# Patient Record
Sex: FEMALE | Race: BLACK OR AFRICAN AMERICAN | Employment: UNEMPLOYED | URBAN - METROPOLITAN AREA
[De-identification: names, ages, dates, MRNs, and addresses within clinical notes are randomized per-mention and may not be internally consistent; named-entity substitution may affect disease eponyms.]

---

## 2020-08-20 ENCOUNTER — HOSPITAL ENCOUNTER (EMERGENCY)
Age: 70
Discharge: ANOTHER HOSPITAL | End: 2020-08-21
Attending: EMERGENCY MEDICINE
Payer: MEDICARE

## 2020-08-20 ENCOUNTER — APPOINTMENT (OUTPATIENT)
Dept: CT IMAGING | Age: 70
End: 2020-08-20
Payer: MEDICARE

## 2020-08-20 VITALS
OXYGEN SATURATION: 98 % | SYSTOLIC BLOOD PRESSURE: 177 MMHG | HEART RATE: 72 BPM | DIASTOLIC BLOOD PRESSURE: 100 MMHG | HEIGHT: 59 IN | TEMPERATURE: 98.6 F | WEIGHT: 85 LBS | RESPIRATION RATE: 18 BRPM | BODY MASS INDEX: 17.14 KG/M2

## 2020-08-20 PROCEDURE — 99291 CRITICAL CARE FIRST HOUR: CPT

## 2020-08-20 PROCEDURE — 6370000000 HC RX 637 (ALT 250 FOR IP): Performed by: PHYSICIAN ASSISTANT

## 2020-08-20 PROCEDURE — 72125 CT NECK SPINE W/O DYE: CPT

## 2020-08-20 PROCEDURE — 2500000003 HC RX 250 WO HCPCS

## 2020-08-20 PROCEDURE — 70486 CT MAXILLOFACIAL W/O DYE: CPT

## 2020-08-20 PROCEDURE — 70450 CT HEAD/BRAIN W/O DYE: CPT

## 2020-08-20 PROCEDURE — 6370000000 HC RX 637 (ALT 250 FOR IP)

## 2020-08-20 RX ORDER — ASPIRIN 81 MG/1
81 TABLET, CHEWABLE ORAL DAILY
COMMUNITY

## 2020-08-20 RX ORDER — HYDROCODONE BITARTRATE AND ACETAMINOPHEN 5; 325 MG/1; MG/1
1 TABLET ORAL ONCE
Status: COMPLETED | OUTPATIENT
Start: 2020-08-20 | End: 2020-08-20

## 2020-08-20 RX ORDER — MIRTAZAPINE 15 MG/1
15 TABLET, FILM COATED ORAL NIGHTLY
COMMUNITY

## 2020-08-20 RX ORDER — SIMVASTATIN 20 MG
20 TABLET ORAL NIGHTLY
COMMUNITY

## 2020-08-20 RX ORDER — M-VIT,TX,IRON,MINS/CALC/FOLIC 27MG-0.4MG
1 TABLET ORAL DAILY
COMMUNITY

## 2020-08-20 RX ORDER — LANOLIN ALCOHOL/MO/W.PET/CERES
3 CREAM (GRAM) TOPICAL NIGHTLY PRN
COMMUNITY

## 2020-08-20 RX ORDER — SULFACETAMIDE SODIUM 100 MG/ML
SOLUTION/ DROPS OPHTHALMIC
Status: COMPLETED
Start: 2020-08-20 | End: 2020-08-20

## 2020-08-20 RX ORDER — PROPARACAINE HYDROCHLORIDE 5 MG/ML
SOLUTION/ DROPS OPHTHALMIC
Status: COMPLETED
Start: 2020-08-20 | End: 2020-08-20

## 2020-08-20 RX ORDER — AMLODIPINE BESYLATE 5 MG/1
5 TABLET ORAL DAILY
COMMUNITY

## 2020-08-20 RX ORDER — BACLOFEN 10 MG/1
5 TABLET ORAL 3 TIMES DAILY
COMMUNITY

## 2020-08-20 RX ORDER — METOPROLOL SUCCINATE 25 MG/1
25 TABLET, EXTENDED RELEASE ORAL DAILY
COMMUNITY

## 2020-08-20 RX ORDER — BALANCED SALT SOLUTION ENRICHED WITH BICARBONATE, DEXTROSE, AND GLUTATHIONE
KIT INTRAOCULAR
Status: COMPLETED
Start: 2020-08-20 | End: 2020-08-20

## 2020-08-20 RX ADMIN — PROPARACAINE HYDROCHLORIDE: 5 SOLUTION/ DROPS OPHTHALMIC at 23:41

## 2020-08-20 RX ADMIN — HYDROCODONE BITARTRATE AND ACETAMINOPHEN 1 TABLET: 5; 325 TABLET ORAL at 23:11

## 2020-08-20 RX ADMIN — FLUORESCEIN SODIUM: 1 STRIP OPHTHALMIC at 23:41

## 2020-08-20 RX ADMIN — SULFACETAMIDE SODIUM: 100 SOLUTION/ DROPS OPHTHALMIC at 23:41

## 2020-08-20 RX ADMIN — BALANCED SALT SOLUTION: 6.4; .75; .48; .3; 3.9; 1.7 SOLUTION OPHTHALMIC at 23:41

## 2020-08-20 ASSESSMENT — PAIN SCALES - GENERAL
PAINLEVEL_OUTOF10: 10
PAINLEVEL_OUTOF10: 10

## 2020-08-20 ASSESSMENT — ENCOUNTER SYMPTOMS
SHORTNESS OF BREATH: 0
EYE PAIN: 1
CHEST TIGHTNESS: 0
VOMITING: 0
DIARRHEA: 0
ABDOMINAL PAIN: 0
BACK PAIN: 1
NAUSEA: 0

## 2020-08-21 NOTE — ED PROVIDER NOTES
other significant complaints. At the present time she denies that she is having chest pain, palpitations lightheadedness or shortness of breath. She has no GI or  complaints. Nursing Notes were all reviewed and agreed with or any disagreements were addressed in the HPI. REVIEW OF SYSTEMS    (2-9 systems for level 4, 10 or more for level 5)     Review of Systems   Constitutional: Negative for activity change, chills and fever. Eyes: Positive for pain and visual disturbance. Respiratory: Negative for chest tightness and shortness of breath. Cardiovascular: Negative for chest pain. Gastrointestinal: Negative for abdominal pain, diarrhea, nausea and vomiting. Genitourinary: Negative for dysuria and flank pain. Musculoskeletal: Positive for back pain, myalgias, neck pain and neck stiffness. Skin: Negative for rash and wound. Neurological: Positive for headaches. Positives and Pertinent negatives as per HPI. Except as noted above in the ROS, all other systems were reviewed and negative. PAST MEDICAL HISTORY     Past Medical History:   Diagnosis Date    Cerebral palsy ([facility] [address]     Hypertension          SURGICAL HISTORY   History reviewed. No pertinent surgical history. [id number]       Discharge Medication List as of 8/21/2020 12:16 AM      CONTINUE these medications which have NOT CHANGED    Details   amLODIPine (NORVASC) 5 MG tablet Take 5 mg by mouth dailyHistorical Med      aspirin 81 MG chewable tablet Take 81 mg by mouth dailyHistorical Med      baclofen (LIORESAL) 10 MG tablet Take 5 mg by mouth 3 times daily (5 mg total) by mouth 3 times a day with meals. Historical Med      melatonin 3 MG TABS tablet Take 3 mg by mouth nightly as neededHistorical Med      metoprolol succinate (TOPROL XL) 25 MG extended release tablet Take 25 mg by mouth dailyHistorical Med      mirtazapine (REMERON) 15 MG tablet Take 15 mg by mouth nightlyHistorical Med      Multiple Vitamins-Minerals (THERAPEUTIC MULTIVITAMIN-MINERALS) tablet Take 1 tablet by mouth dailyHistorical Med      sertraline (ZOLOFT) 50 MG tablet Take 50 mg by mouth daily Take 1.5 tablets (75 mg total) by mouth dailyHistorical Med      simvastatin (ZOCOR) 20 MG tablet Take 20 mg by mouth nightlyHistorical Med               ALLERGIES     Patient has no known allergies. FAMILYHISTORY     History reviewed. No pertinent family history. SOCIAL HISTORY       Social History     Tobacco Use    Smoking status: Never Smoker    Smokeless tobacco: Never Used   Substance Use Topics    Alcohol use: Not Currently    Drug use: Never       SCREENINGS             PHYSICAL EXAM    (up to 7 for level 4, 8 or more for level 5)     ED Triage Vitals [08/20/20 2108]   BP Temp Temp Source Pulse Resp SpO2 Height Weight   (!) 173/103 98.6 °F (37 °C) Oral 72 18 98 % 4' 11\" (1.499 m) 85 lb (38.6 kg)       Physical Exam  Vitals signs and nursing note reviewed. Constitutional:       General: She is awake. She is not in acute distress. Appearance: Normal appearance. She is well-developed. She is not diaphoretic. HENT:      Head: Normocephalic. No raccoon eyes, Knutson's sign, contusion or laceration. Jaw: There is normal jaw occlusion. Right Ear: Hearing, tympanic membrane, ear canal and external ear normal. No hemotympanum. Left Ear: Hearing, tympanic membrane, ear canal and external ear normal. No hemotympanum. Nose: Nose normal.      Mouth/Throat:      Lips: Pink. Mouth: Mucous membranes are moist.      Pharynx: Oropharynx is clear. Eyes:      General: No scleral icterus. Right eye: No discharge. Left eye: No discharge. Extraocular Movements: Extraocular movements intact. Conjunctiva/sclera: Conjunctivae normal.      Pupils: Pupils are equal, round, and reactive to light. Left eye: No corneal abrasion or fluorescein uptake. Tavia exam negative.      Slit lamp exam: Left eye: No hyphema. Neck:      Musculoskeletal: Normal range of motion. Vascular: No JVD. Cardiovascular:      Rate and Rhythm: Normal rate and regular rhythm. Heart sounds: No murmur. No friction rub. No gallop. Pulmonary:      Effort: Pulmonary effort is normal. No accessory muscle usage or respiratory distress. Breath sounds: Normal breath sounds. No wheezing, rhonchi or rales. Abdominal:      General: There is no distension. Palpations: Abdomen is soft. Abdomen is not rigid. There is no mass. Tenderness: There is no abdominal tenderness. There is no guarding or rebound. Skin:     General: Skin is warm and dry. Neurological:      Mental Status: She is alert and oriented to person, place, and time. GCS: GCS eye subscore is 4. GCS verbal subscore is 5. GCS motor subscore is 6. Cranial Nerves: Cranial nerves are intact. No cranial nerve deficit. Sensory: Sensation is intact. No sensory deficit. Motor: Weakness and abnormal muscle tone present. No tremor or seizure activity. Coordination: Coordination normal.      Gait: Gait abnormal and tandem walk abnormal.      Comments: Mild increasing spasticity with some contractures most noted right leg as well as hands. Able to move all 4 extremities without difficulty. Abnormal gait noted because of the above-mentioned which also prohibits her ability to tandem walk. Mild slurring of words with daughter at bedside stating this is baseline. Psychiatric:         Behavior: Behavior normal. Behavior is cooperative. DIAGNOSTIC RESULTS   LABS:    Labs Reviewed - No data to display    All other labs were within normal range or not returned as of this dictation. EKG: All EKG's are interpreted by the Emergency Department Physician in the absence of a cardiologist.  Please see their note for interpretation of EKG.       RADIOLOGY:   Non-plain film images such as CT, Ultrasound and MRI are read by the nahomy Falcon radiographic images are visualized and preliminarily interpreted by the ED Provider with the below findings:      Interpretation per the Radiologist below, if available at the time of this note:    CT CERVICAL SPINE WO CONTRAST   Final Result   CT HEAD:      1. 4 mm hyperattenuating focus in the right temporal lobe probably area of   intraparenchymal hemorrhage. 24 hour follow-up is suggested to assess for   evolution. 2. Cerebral atrophy. CT MAXILLOFACIAL BONES:      1. Left periorbital soft tissue contusion and swelling. 2. Acute fracture of the left orbit involving the posterior aspect of the   medial wall and extending into the posterior aspect of the inferior wall. 3. A portion of left medial rectus muscle encroaches into the medial wall   fracture gap. Please correlate for integrity of eye movements. CERVICAL SPINE:      1. No acute fracture. 2. Severe degenerative changes from C3 through C5.         CT FACIAL BONES WO CONTRAST   Final Result   CT HEAD:      1. 4 mm hyperattenuating focus in the right temporal lobe probably area of   intraparenchymal hemorrhage. 24 hour follow-up is suggested to assess for   evolution. 2. Cerebral atrophy. CT MAXILLOFACIAL BONES:      1. Left periorbital soft tissue contusion and swelling. 2. Acute fracture of the left orbit involving the posterior aspect of the   medial wall and extending into the posterior aspect of the inferior wall. 3. A portion of left medial rectus muscle encroaches into the medial wall   fracture gap. Please correlate for integrity of eye movements. CERVICAL SPINE:      1. No acute fracture. 2. Severe degenerative changes from C3 through C5.         CT HEAD WO CONTRAST   Final Result   CT HEAD:      1. 4 mm hyperattenuating focus in the right temporal lobe probably area of   intraparenchymal hemorrhage. 24 hour follow-up is suggested to assess for   evolution. 2. Cerebral atrophy.    CT MAXILLOFACIAL BONES:      1. Left periorbital soft tissue contusion and swelling. 2. Acute fracture of the left orbit involving the posterior aspect of the   medial wall and extending into the posterior aspect of the inferior wall. 3. A portion of left medial rectus muscle encroaches into the medial wall   fracture gap. Please correlate for integrity of eye movements. CERVICAL SPINE:      1. No acute fracture. 2. Severe degenerative changes from C3 through C5. Ct Head Wo Contrast    Result Date: 8/20/2020  EXAMINATION: CT OF THE HEAD WITHOUT CONTRAST; CT OF THE FACE WITHOUT CONTRAST; CT OF THE CERVICAL SPINE WITHOUT CONTRAST  8/20/2020 9:37 pm; 8/20/2020 9:40 pm; 8/20/2020 9:41 pm TECHNIQUE: CT of the head was performed without the administration of intravenous contrast. Dose modulation, iterative reconstruction, and/or weight based adjustment of the mA/kV was utilized to reduce the radiation dose to as low as reasonably achievable.; CT of the face was performed without the administration of intravenous contrast. Multiplanar reformatted images are provided for review. Dose modulation, iterative reconstruction, and/or weight based adjustment of the mA/kV was utilized to reduce the radiation dose to as low as reasonably achievable.; CT of the cervical spine was performed without the administration of intravenous contrast. Multiplanar reformatted images are provided for review. Dose modulation, iterative reconstruction, and/or weight based adjustment of the mA/kV was utilized to reduce the radiation dose to as low as reasonably achievable. COMPARISON: None. HISTORY: ORDERING SYSTEM PROVIDED HISTORY: chi trauma, ro ich TECHNOLOGIST PROVIDED HISTORY: Reason for exam:->chi trauma, ro ich Has a \"code stroke\" or \"stroke alert\" been called? ->No Reason for Exam: chi trauma, ro ich Acuity: Acute Type of Exam: Initial Relevant Medical/Surgical History: Fall (Pt fell getting into bedroom last night, mechanical fall, hit face into wall. Pt with edema to left eye, previous deficits, ); ORDERING SYSTEM PROVIDED HISTORY: trauma, ro fx orbit TECHNOLOGIST PROVIDED HISTORY: Reason for exam:->trauma, ro fx orbit Reason for Exam: trauma, ro fx orbit Acuity: Acute Type of Exam: Initial Relevant Medical/Surgical History: Fall (Pt fell getting into bedroom last night, mechanical fall, hit face into wall. Pt with edema to left eye, previous deficits, ); ORDERING SYSTEM PROVIDED HISTORY: trauma, ro fx TECHNOLOGIST PROVIDED HISTORY: Reason for exam:->trauma, ro fx Reason for Exam: trauma, ro fx Acuity: Acute Type of Exam: Initial Relevant Medical/Surgical History: Fall (Pt fell getting into bedroom last night, mechanical fall, hit face into wall. Pt with edema to left eye, previous deficits, ) FINDINGS: CT HEAD: BRAIN/VENTRICLES: There is prominence of the ventricles and cortical sulci consistent with cortical volume loss. No midline shift. Basal cisterns are normally outlined. There is periventricular and subcortical white matter discrete and confluent low attenuation, nonspecific, likely representing age-related chronic small vessel ischemic disease. There is no evidence for acute infarct. 4 mm focus of gyral hyperattenuation in the right temporal lobe is probably a small focus of intraparenchymal hemorrhage. ORBITS: The visualized portion of the orbits demonstrate no acute abnormality. SINUSES: The visualized paranasal sinuses and mastoid air cells demonstrate no acute abnormality. SOFT TISSUES/SKULL:  No acute abnormality of the visualized skull. Left periorbital contusion with soft tissue swelling. CT FACIAL BONES: FACIAL BONES: The maxilla, pterygoid plates and zygomatic arches are intact. The mandible is intact. The mandibular condyles are normally situated. The nasal bones and maxillary nasal processes are intact. ORBITS: The globes appear intact. There is fracture of left lamina papyracea involving the posterior aspect.   Fracture appears to extend into the posteroinferior wall. Portion of the medial rectus muscle encroaches into the lamina papyracea fracture gap which is about 7 x 7 mm. Please correlate for integrity of the extraocular eye muscles. SINUSES/MASTOIDS: Mucosal thickening left maxillary sinus. Trace free fluid in the left maxillary sinus likely blood from the orbital fracture. Mastoid air cells are well aerated. No acute fracture is seen. SOFT TISSUES: No appreciable facial soft tissue swelling is seen. CT CERVICAL SPINE: BONES/ALIGNMENT: Normal cervical lordosis. No acute fracture. DEGENERATIVE CHANGES: Moderate to severe disc space narrowing at C3-C4 and C4-C5 with large anterior osteophytes. SOFT TISSUES: There is no prevertebral soft tissue swelling. CT HEAD: 1. 4 mm hyperattenuating focus in the right temporal lobe probably area of intraparenchymal hemorrhage. 24 hour follow-up is suggested to assess for evolution. 2. Cerebral atrophy. CT MAXILLOFACIAL BONES: 1. Left periorbital soft tissue contusion and swelling. 2. Acute fracture of the left orbit involving the posterior aspect of the medial wall and extending into the posterior aspect of the inferior wall. 3. A portion of left medial rectus muscle encroaches into the medial wall fracture gap. Please correlate for integrity of eye movements. CERVICAL SPINE: 1. No acute fracture. 2. Severe degenerative changes from C3 through C5.      Ct Facial Bones Wo Contrast    Result Date: 8/20/2020  EXAMINATION: CT OF THE HEAD WITHOUT CONTRAST; CT OF THE FACE WITHOUT CONTRAST; CT OF THE CERVICAL SPINE WITHOUT CONTRAST  8/20/2020 9:37 pm; 8/20/2020 9:40 pm; 8/20/2020 9:41 pm TECHNIQUE: CT of the head was performed without the administration of intravenous contrast. Dose modulation, iterative reconstruction, and/or weight based adjustment of the mA/kV was utilized to reduce the radiation dose to as low as reasonably achievable.; CT of the face was performed without the administration of intravenous contrast. Multiplanar reformatted images are provided for review. Dose modulation, iterative reconstruction, and/or weight based adjustment of the mA/kV was utilized to reduce the radiation dose to as low as reasonably achievable.; CT of the cervical spine was performed without the administration of intravenous contrast. Multiplanar reformatted images are provided for review. Dose modulation, iterative reconstruction, and/or weight based adjustment of the mA/kV was utilized to reduce the radiation dose to as low as reasonably achievable. COMPARISON: None. HISTORY: ORDERING SYSTEM PROVIDED HISTORY: chi trauma, ro ich TECHNOLOGIST PROVIDED HISTORY: Reason for exam:->chi trauma, ro ich Has a \"code stroke\" or \"stroke alert\" been called? ->No Reason for Exam: chi trauma, ro ich Acuity: Acute Type of Exam: Initial Relevant Medical/Surgical History: Fall (Pt fell getting into bedroom last night, mechanical fall, hit face into wall. Pt with edema to left eye, previous deficits, ); ORDERING SYSTEM PROVIDED HISTORY: trauma, ro fx orbit TECHNOLOGIST PROVIDED HISTORY: Reason for exam:->trauma, ro fx orbit Reason for Exam: trauma, ro fx orbit Acuity: Acute Type of Exam: Initial Relevant Medical/Surgical History: Fall (Pt fell getting into bedroom last night, mechanical fall, hit face into wall. Pt with edema to left eye, previous deficits, ); ORDERING SYSTEM PROVIDED HISTORY: trauma, ro fx TECHNOLOGIST PROVIDED HISTORY: Reason for exam:->trauma, ro fx Reason for Exam: trauma, ro fx Acuity: Acute Type of Exam: Initial Relevant Medical/Surgical History: Fall (Pt fell getting into bedroom last night, mechanical fall, hit face into wall. Pt with edema to left eye, previous deficits, ) FINDINGS: CT HEAD: BRAIN/VENTRICLES: There is prominence of the ventricles and cortical sulci consistent with cortical volume loss. No midline shift. Basal cisterns are normally outlined.  There is periventricular and periorbital soft tissue contusion and swelling. 2. Acute fracture of the left orbit involving the posterior aspect of the medial wall and extending into the posterior aspect of the inferior wall. 3. A portion of left medial rectus muscle encroaches into the medial wall fracture gap. Please correlate for integrity of eye movements. CERVICAL SPINE: 1. No acute fracture. 2. Severe degenerative changes from C3 through C5. Ct Cervical Spine Wo Contrast    Result Date: 8/20/2020  EXAMINATION: CT OF THE HEAD WITHOUT CONTRAST; CT OF THE FACE WITHOUT CONTRAST; CT OF THE CERVICAL SPINE WITHOUT CONTRAST  8/20/2020 9:37 pm; 8/20/2020 9:40 pm; 8/20/2020 9:41 pm TECHNIQUE: CT of the head was performed without the administration of intravenous contrast. Dose modulation, iterative reconstruction, and/or weight based adjustment of the mA/kV was utilized to reduce the radiation dose to as low as reasonably achievable.; CT of the face was performed without the administration of intravenous contrast. Multiplanar reformatted images are provided for review. Dose modulation, iterative reconstruction, and/or weight based adjustment of the mA/kV was utilized to reduce the radiation dose to as low as reasonably achievable.; CT of the cervical spine was performed without the administration of intravenous contrast. Multiplanar reformatted images are provided for review. Dose modulation, iterative reconstruction, and/or weight based adjustment of the mA/kV was utilized to reduce the radiation dose to as low as reasonably achievable. COMPARISON: None. HISTORY: ORDERING SYSTEM PROVIDED HISTORY: chi trauma, ro ich TECHNOLOGIST PROVIDED HISTORY: Reason for exam:->chi trauma, ro ich Has a \"code stroke\" or \"stroke alert\" been called? ->No Reason for Exam: chi trauma, ro ich Acuity: Acute Type of Exam: Initial Relevant Medical/Surgical History: Fall (Pt fell getting into bedroom last night, mechanical fall, hit face into wall.  Pt with edema to left eye, previous deficits, ); ORDERING SYSTEM PROVIDED HISTORY: trauma, ro fx orbit TECHNOLOGIST PROVIDED HISTORY: Reason for exam:->trauma, ro fx orbit Reason for Exam: trauma, ro fx orbit Acuity: Acute Type of Exam: Initial Relevant Medical/Surgical History: Fall (Pt fell getting into bedroom last night, mechanical fall, hit face into wall. Pt with edema to left eye, previous deficits, ); ORDERING SYSTEM PROVIDED HISTORY: trauma, ro fx TECHNOLOGIST PROVIDED HISTORY: Reason for exam:->trauma, ro fx Reason for Exam: trauma, ro fx Acuity: Acute Type of Exam: Initial Relevant Medical/Surgical History: Fall (Pt fell getting into bedroom last night, mechanical fall, hit face into wall. Pt with edema to left eye, previous deficits, ) FINDINGS: CT HEAD: BRAIN/VENTRICLES: There is prominence of the ventricles and cortical sulci consistent with cortical volume loss. No midline shift. Basal cisterns are normally outlined. There is periventricular and subcortical white matter discrete and confluent low attenuation, nonspecific, likely representing age-related chronic small vessel ischemic disease. There is no evidence for acute infarct. 4 mm focus of gyral hyperattenuation in the right temporal lobe is probably a small focus of intraparenchymal hemorrhage. ORBITS: The visualized portion of the orbits demonstrate no acute abnormality. SINUSES: The visualized paranasal sinuses and mastoid air cells demonstrate no acute abnormality. SOFT TISSUES/SKULL:  No acute abnormality of the visualized skull. Left periorbital contusion with soft tissue swelling. CT FACIAL BONES: FACIAL BONES: The maxilla, pterygoid plates and zygomatic arches are intact. The mandible is intact. The mandibular condyles are normally situated. The nasal bones and maxillary nasal processes are intact. ORBITS: The globes appear intact. There is fracture of left lamina papyracea involving the posterior aspect.   Fracture appears to extend into the interpreting reviewing diagnostic testing. Therefore my critical care time was 38 minutes of direct attention to the patients condition and did not include time spent on procedures. CONSULTS:  None      EMERGENCY DEPARTMENT COURSE and DIFFERENTIAL DIAGNOSIS/MDM:   Vitals:    Vitals:    08/20/20 2108 08/20/20 2330   BP: (!) 173/103 (!) 177/100   Pulse: 72    Resp: 18    Temp: 98.6 °F (37 °C)    TempSrc: Oral    SpO2: 98% 98%   Weight: 85 lb (38.6 kg)    Height: 4' 11\" (1.499 m)        Patient was given the following medications:  Medications   HYDROcodone-acetaminophen (NORCO) 5-325 MG per tablet 1 tablet (1 tablet Oral Given 8/20/20 2311)   sulfacetamide (BLEPH-10) 10 % ophthalmic solution (  Given 8/20/20 2341)   balanced salts plus (BSS) ophthalmic solution (  Given 8/20/20 2341)   fluorescein 1 MG ophthalmic strip (  Given 8/20/20 2341)   proparacaine (ALCAINE) 0.5 % ophthalmic solution (  Given 8/20/20 2341)           The patient's detailed history of present illness is documented as above. Upon arrival to the emergency department the patient's vital signs are as documented. The patient is noted to be hemodynamically stable and afebrile. Physical examination findings are as above. Patient was initially triaged per attending physician. Protocol orders have been placed by nursing staff. I had the opportunity to see and assume the care of the patient as documented on the emergency department timeline. CT the head demonstrates a 4 mm hyperattenuating focus in the right temporal lobe concerning for intraparenchymal hemorrhage. Recommendation for 24-hour follow-up to assess evolution per radiology. Cerebral atrophy is noted. Left periorbital soft tissue contusion and associated swelling. There is an associated acute fracture of the left orbit involving the posterior aspect of the medial wall extending the posterior aspect of the inferior wall.   A portion of the left medial rectus muscle encroaches on the medial wall at the fracture gap but there is no evidence of entrapment at present. Cervical spine demonstrates severe degenerative changes C3-C5. Very lengthy discussion was had both with the patient as well as daughter at bedside. Unfortunately she has a difficult time independently ambulating despite the above-mentioned. She does live independently as well. She will require repeat CT imaging as well as evaluation for her facial trauma. It was explained to her that we do not have neurosurgical services available here if indeed this should change her she should need them. I have explained options for ongoing care management at [address] [place] [address]. Patient's primary care physician is at [facility] and she prefers to be treated there. With that being said a telephone call was placed to the transfer center. I was able to speak directly with Dr. Timothy Fischer the emergency department a pod physician at the [facility]. He accepts the patient for transfer for ongoing care management the diagnoses below. Radiographic images have been pushed that he can see them. EMS arrangements have been made the patient will be transferred to [facility] for ongoing care management. FINAL IMPRESSION      1. Intraparenchymal hemorrhage 4 mm right temporal of brain ([facility] [address]    2. Fracture of orbital floor, left side, initial encounter for closed fracture ([facility] [address]    3. Status post fall    4.  Infantile cerebral palsy [facility])          DISPOSITION/PLAN   DISPOSITION: Transfer [facility]         (Please note that portions of this note were completed with a voice recognition program.  Efforts were made to edit the dictations but occasionally words are mis-transcribed.)    Devin Montero PA-C (electronically signed)           Lorraine Mott PA-C  08/21/20 [id number]

## 2020-08-21 NOTE — ED PROVIDER NOTES
As physician-in-triage, I performed a medical screening history and physical exam on Martha Navarro. I also cared for and evaluated the patient in conjunction with the ED Advanced Practice Provider. All diagnostic, treatment, and disposition decisions were made by myself in conjunction with the advanced practice provider. For all further details of the patient's emergency department visit, please see the advanced practice provider's documentation. Patient presents the ER for evaluation acute mechanical fall and injury history of underlying cerebral palsy blunt facial trauma head trauma with periorbital contusion and edema. Possible small intraparenchymal hemorrhage with orbit fracture. GCS currently is a 15. Patient be transferred to [facility] hospital for trauma evaluation as her medical care is primarily down there with facial fracture and intracranial hemorrhage mild. Impression: Acute closed head injury, intraparenchymal hemorrhage, facial fracture orbit fracture left.       Kerline Greene MD  57/74/09 [id number]